# Patient Record
Sex: MALE | Race: WHITE | Employment: OTHER | URBAN - METROPOLITAN AREA
[De-identification: names, ages, dates, MRNs, and addresses within clinical notes are randomized per-mention and may not be internally consistent; named-entity substitution may affect disease eponyms.]

---

## 2017-01-01 ENCOUNTER — DOCUMENTATION ONLY (OUTPATIENT)
Dept: GERIATRIC MEDICINE | Age: 77
End: 2017-01-01

## 2017-01-01 ENCOUNTER — HOSPITAL ENCOUNTER (EMERGENCY)
Age: 77
Discharge: HOME | End: 2017-12-31
Attending: EMERGENCY MEDICINE
Payer: MEDICARE

## 2017-01-01 ENCOUNTER — APPOINTMENT (OUTPATIENT)
Dept: CT IMAGING | Age: 77
End: 2017-01-01
Attending: EMERGENCY MEDICINE
Payer: MEDICARE

## 2017-01-01 VITALS
DIASTOLIC BLOOD PRESSURE: 78 MMHG | HEIGHT: 65 IN | TEMPERATURE: 98.2 F | HEART RATE: 82 BPM | RESPIRATION RATE: 18 BRPM | OXYGEN SATURATION: 100 % | SYSTOLIC BLOOD PRESSURE: 138 MMHG | WEIGHT: 142.8 LBS | BODY MASS INDEX: 23.79 KG/M2

## 2017-01-01 DIAGNOSIS — R46.89 WANDERING: Primary | ICD-10-CM

## 2017-01-01 DIAGNOSIS — R53.81 DEBILITY: ICD-10-CM

## 2017-01-01 LAB
ALBUMIN SERPL-MCNC: 3.6 G/DL (ref 3.5–5)
ALBUMIN/GLOB SERPL: 1.2 {RATIO} (ref 1.1–2.2)
ALP SERPL-CCNC: 64 U/L (ref 45–117)
ALT SERPL-CCNC: 17 U/L (ref 12–78)
ANION GAP SERPL CALC-SCNC: 7 MMOL/L (ref 5–15)
APPEARANCE UR: CLEAR
AST SERPL-CCNC: 14 U/L (ref 15–37)
BACTERIA URNS QL MICRO: NEGATIVE /HPF
BASOPHILS # BLD: 0 K/UL (ref 0–0.1)
BASOPHILS NFR BLD: 0 % (ref 0–1)
BILIRUB SERPL-MCNC: 0.6 MG/DL (ref 0.2–1)
BILIRUB UR QL: NEGATIVE
BUN SERPL-MCNC: 24 MG/DL (ref 6–20)
BUN/CREAT SERPL: 16 (ref 12–20)
CALCIUM SERPL-MCNC: 8.5 MG/DL (ref 8.5–10.1)
CHLORIDE SERPL-SCNC: 106 MMOL/L (ref 97–108)
CO2 SERPL-SCNC: 27 MMOL/L (ref 21–32)
COLOR UR: YELLOW
CREAT SERPL-MCNC: 1.48 MG/DL (ref 0.7–1.3)
EOSINOPHIL # BLD: 0 K/UL (ref 0–0.4)
EOSINOPHIL NFR BLD: 0 % (ref 0–7)
EPITH CASTS URNS QL MICRO: NORMAL /LPF
ERYTHROCYTE [DISTWIDTH] IN BLOOD BY AUTOMATED COUNT: 13.4 % (ref 11.5–14.5)
GLOBULIN SER CALC-MCNC: 3.1 G/DL (ref 2–4)
GLUCOSE SERPL-MCNC: 91 MG/DL (ref 65–100)
GLUCOSE UR STRIP.AUTO-MCNC: NEGATIVE MG/DL
HCT VFR BLD AUTO: 38.1 % (ref 36.6–50.3)
HGB BLD-MCNC: 12.9 G/DL (ref 12.1–17)
HGB UR QL STRIP: NEGATIVE
KETONES UR QL STRIP.AUTO: NORMAL MG/DL
LEUKOCYTE ESTERASE UR QL STRIP.AUTO: NEGATIVE
LYMPHOCYTES # BLD: 1.6 K/UL (ref 0.8–3.5)
LYMPHOCYTES NFR BLD: 28 % (ref 12–49)
MCH RBC QN AUTO: 31.4 PG (ref 26–34)
MCHC RBC AUTO-ENTMCNC: 33.9 G/DL (ref 30–36.5)
MCV RBC AUTO: 92.7 FL (ref 80–99)
MONOCYTES # BLD: 0.5 K/UL (ref 0–1)
MONOCYTES NFR BLD: 10 % (ref 5–13)
NEUTS SEG # BLD: 3.4 K/UL (ref 1.8–8)
NEUTS SEG NFR BLD: 62 % (ref 32–75)
NITRITE UR QL STRIP.AUTO: NEGATIVE
PH UR STRIP: 5.5 [PH]
PLATELET # BLD AUTO: 193 K/UL (ref 150–400)
POTASSIUM SERPL-SCNC: 4.2 MMOL/L (ref 3.5–5.1)
PROT SERPL-MCNC: 6.7 G/DL (ref 6.4–8.2)
PROT UR STRIP-MCNC: NEGATIVE MG/DL
RBC # BLD AUTO: 4.11 M/UL (ref 4.1–5.7)
RBC #/AREA URNS HPF: NORMAL /HPF (ref 0–5)
SODIUM SERPL-SCNC: 140 MMOL/L (ref 136–145)
SP GR UR REFRACTOMETRY: 1.02
UROBILINOGEN UR QL STRIP.AUTO: 0.2 EU/DL
WBC # BLD AUTO: 5.6 K/UL (ref 4.1–11.1)
WBC URNS QL MICRO: NORMAL /HPF (ref 0–4)

## 2017-01-01 PROCEDURE — 80053 COMPREHEN METABOLIC PANEL: CPT | Performed by: EMERGENCY MEDICINE

## 2017-01-01 PROCEDURE — 85025 COMPLETE CBC W/AUTO DIFF WBC: CPT | Performed by: EMERGENCY MEDICINE

## 2017-01-01 PROCEDURE — 36415 COLL VENOUS BLD VENIPUNCTURE: CPT | Performed by: EMERGENCY MEDICINE

## 2017-01-01 PROCEDURE — 99284 EMERGENCY DEPT VISIT MOD MDM: CPT

## 2017-01-01 PROCEDURE — 77030011943

## 2017-01-01 PROCEDURE — 70450 CT HEAD/BRAIN W/O DYE: CPT

## 2017-01-01 PROCEDURE — 81003 URINALYSIS AUTO W/O SCOPE: CPT | Performed by: EMERGENCY MEDICINE

## 2017-04-12 RX ORDER — PRAVASTATIN SODIUM 20 MG/1
TABLET ORAL
Qty: 30 TAB | Refills: 0 | Status: SHIPPED | OUTPATIENT
Start: 2017-04-12 | End: 2017-05-10 | Stop reason: SDUPTHER

## 2017-05-10 RX ORDER — PRAVASTATIN SODIUM 20 MG/1
TABLET ORAL
Qty: 30 TAB | Refills: 0 | Status: SHIPPED | OUTPATIENT
Start: 2017-05-10 | End: 2017-06-07 | Stop reason: SDUPTHER

## 2017-06-15 ENCOUNTER — TELEPHONE (OUTPATIENT)
Dept: INTERNAL MEDICINE CLINIC | Age: 77
End: 2017-06-15

## 2017-06-15 ENCOUNTER — OFFICE VISIT (OUTPATIENT)
Dept: INTERNAL MEDICINE CLINIC | Age: 77
End: 2017-06-15

## 2017-06-15 VITALS
BODY MASS INDEX: 24.57 KG/M2 | WEIGHT: 147.5 LBS | HEIGHT: 65 IN | SYSTOLIC BLOOD PRESSURE: 100 MMHG | RESPIRATION RATE: 18 BRPM | DIASTOLIC BLOOD PRESSURE: 60 MMHG | TEMPERATURE: 97.5 F | OXYGEN SATURATION: 97 % | HEART RATE: 68 BPM

## 2017-06-15 DIAGNOSIS — R11.0 NAUSEA: ICD-10-CM

## 2017-06-15 DIAGNOSIS — I25.810 ATHEROSCLEROSIS OF CORONARY ARTERY BYPASS GRAFT OF NATIVE HEART, ANGINA PRESENCE UNSPECIFIED: ICD-10-CM

## 2017-06-15 DIAGNOSIS — R42 DIZZINESS: ICD-10-CM

## 2017-06-15 DIAGNOSIS — G30.9 ALZHEIMER'S DEMENTIA WITHOUT BEHAVIORAL DISTURBANCE, UNSPECIFIED TIMING OF DEMENTIA ONSET: Primary | Chronic | ICD-10-CM

## 2017-06-15 DIAGNOSIS — L40.9 PSORIASIS: ICD-10-CM

## 2017-06-15 DIAGNOSIS — Z13.39 SCREENING FOR ALCOHOLISM: ICD-10-CM

## 2017-06-15 DIAGNOSIS — F02.80 ALZHEIMER'S DEMENTIA WITHOUT BEHAVIORAL DISTURBANCE, UNSPECIFIED TIMING OF DEMENTIA ONSET: Primary | Chronic | ICD-10-CM

## 2017-06-15 DIAGNOSIS — Z00.00 ROUTINE GENERAL MEDICAL EXAMINATION AT A HEALTH CARE FACILITY: ICD-10-CM

## 2017-06-15 DIAGNOSIS — N40.0 BENIGN NON-NODULAR PROSTATIC HYPERPLASIA, PRESENCE OF LOWER URINARY TRACT SYMPTOMS UNSPECIFIED: ICD-10-CM

## 2017-06-15 RX ORDER — MEMANTINE HYDROCHLORIDE 10 MG/1
10 TABLET ORAL DAILY
Qty: 30 TAB | Refills: 5
Start: 2017-06-15

## 2017-06-15 RX ORDER — FLUOCINONIDE 1 MG/G
CREAM TOPICAL DAILY
COMMUNITY
End: 2018-01-01

## 2017-06-15 NOTE — MR AVS SNAPSHOT
Visit Information Date & Time Provider Department Dept. Phone Encounter #  
 6/15/2017  3:00 PM Norm Hatchet, MD [place] [id number] [facility] [phone] [id number] Follow-up Instructions Return in about 6 months (around 12/15/2017) for F/U SDAT. Upcoming Health Maintenance Date Due  
 MEDICARE YEARLY EXAM 8/10/2003 DTaP/Tdap/Td series (1 - Tdap) 1/2/2014 INFLUENZA AGE 9 TO ADULT 8/1/2017 GLAUCOMA SCREENING Q2Y 2/10/2018 Allergies as of 6/15/2017  Review Complete On: 6/15/2017 By: Jose Juan Paul LPN Severity Noted Reaction Type Reactions Peanut  06/23/2011   Systemic Anaphylaxis Sulfa (Sulfonamide Antibiotics)  11/27/2010    Unknown (comments) Current Immunizations  Reviewed on 6/24/2011 Name Date Influenza Vaccine 10/6/2015 Pneumococcal Polysaccharide (PPSV-23) 10/1/2014 Pneumococcal Vaccine (Unspecified Type) 1/1/2009 TB Skin Test (PPD) 10/23/2014 Td 1/1/2014 Zoster Vaccine, Live 2/2/2014 Not reviewed this visit You Were Diagnosed With   
  
 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, angina presence unspecified     ICD-10-CM: I25.810 ICD-9-CM: 414.04 Benign non-nodular prostatic hyperplasia, presence of lower urinary tract symptoms unspecified     ICD-10-CM: N40.0 ICD-9-CM: 600.90 Vitals BP Pulse Temp Resp Height(growth percentile) Weight(growth percentile)  100/60 (BP 1 Location: Right arm, BP Patient Position: Sitting) 68 97.5 °F (36.4 °C) (Oral) 18 5' 4.96\" (1.65 m) 147 lb 8 oz (66.9 kg) SpO2 BMI Smoking Status 97% 24.58 kg/m2 Never Smoker Vitals History BMI and BSA Data Body Mass Index Body Surface Area 24.58 kg/m 2 1.75 m 2 Preferred Pharmacy Pharmacy Name Phone [facility] [address] [place] [address], [facility] [phone] Your Updated Medication List  
  
   
This list is accurate as of: 6/15/17  3:21 PM.  Always use your most recent med list.  
  
  
  
  
 aspirin delayed-release 81 mg tablet Take 81 mg by mouth daily. betamethasone dipropionate 0.05 % topical cream  
Commonly known as:  Nathalie Main Apply  to affected area two (2) times a day. docusate sodium 50 mg capsule Commonly known as:  Arabella Scarce Take 50 mg by mouth two (2) times a day. donepezil 5 mg tablet Commonly known as:  ARICEPT Take 10 mg by mouth nightly. fexofenadine 180 mg tablet Commonly known as:  Alejandro Leash Take  by mouth. * fluocinonide 0.1 % topical cream  
Commonly known as:  VANOS Apply  to affected area daily. * fluocinonide 0.1 % topical cream  
Commonly known as:  VANOS Apply  to affected area daily. memantine 10 mg tablet Commonly known as:  Gisela Orts Take 1 Tab by mouth daily. multivitamin,tx-iron-ca-min 27-0.4 mg Tab Commonly known as:  THERA-M w/ IRON Take 1 Tab by mouth daily. pravastatin 20 mg tablet Commonly known as:  PRAVACHOL  
TAKE 1 TABLET BY MOUTH EVERY DAY  
  
 prednisoLONE sodium phosphate 1 % ophthalmic solution Commonly known as:  INFLAMASE FORTE * Notice: This list has 2 medication(s) that are the same as other medications prescribed for you. Read the directions carefully, and ask your doctor or other care provider to review them with you. Follow-up Instructions Return in about 6 months (around 12/15/2017) for F/U SDAT. To-Do List   
 06/16/2017 Lab:  CBC WITH AUTOMATED DIFF   
  
 06/16/2017 Lab:  LIPID PANEL   
  
 06/16/2017 Lab:  METABOLIC PANEL, COMPREHENSIVE   
  
 06/16/2017 Lab:  PSA W/ REFLX FREE PSA   
  
 06/16/2017 Lab:  TSH REFLEX TO T4   
  
 06/16/2017 Lab:  URINALYSIS W/ RFLX MICROSCOPIC   
  
 06/16/2017 Lab:  VITAMIN B12 & FOLATE Patient Instructions Eat a balanced diet with plenty of fruits and vegetables. Stay physically active. Continue your current medications. Have fasting blood work analysis. Follow up with specialists. Introducing [facility]! Mehnaz Flynn introduces [employer] patient portal. Now you can access parts of your medical record, email your doctor's office, and request medication refills online. 1. In your internet browser, go to [email or web address] 2. Click on the First Time User? Click Here link in the Sign In box. You will see the New Member Sign Up page. 3. Enter your [employer] Access Code exactly as it appears below. You will not need to use this code after youve completed the sign-up process. If you do not sign up before the expiration date, you must request a new code. · [employer] Access Code: [email or web address] Expires: 9/13/2017  3:21 PM 
 
4. Enter the last four digits of your Social Security Number (xxxx) and Date of Birth (mm/dd/yyyy) as indicated and click Submit. You will be taken to the next sign-up page. 5. Create a [employer] ID. This will be your [employer] login ID and cannot be changed, so think of one that is secure and easy to remember. 6. Create a [employer] password. You can change your password at any time. 7. Enter your Password Reset Question and Answer. This can be used at a later time if you forget your password. 8. Enter your e-mail address. You will receive e-mail notification when new information is available in [address] 9. Click Sign Up.  You can now view and download portions of your medical record. 10. Click the Download Summary menu link to download a portable copy of your medical information. If you have questions, please visit the Frequently Asked Questions section of the [employer] website. Remember, [employer] is NOT to be used for urgent needs. For medical emergencies, dial 911. Now available from your iPhone and Android! Please provide this summary of care documentation to your next provider. Your primary care clinician is listed as Yodit Rendon. If you have any questions after today's visit, please call [phone].

## 2017-06-15 NOTE — PROGRESS NOTES
Subjective:     Chief Complaint   Patient presents with    Cholesterol Problem    Back Pain     He  is a 64y.o. year old male who presents for evaluation. Concerns:  1. Psoriasis  2. Can't stand long. 3. Pain in left flank area (x one month)  4. Dizziness  5. Nausea and wooziness      Historical Data    Past Medical History:   Diagnosis Date    CAD (coronary artery disease)     MI    Corneal dystrophy     H/O poliomyelitis     Hypercholesterolemia     Hypertension     Psoriasis     Right inguinal hernia         Past Surgical History:   Procedure Laterality Date    CABG, ARTERY-VEIN, FOUR  8/9/2010    HX APPENDECTOMY      HX CATARACT REMOVAL Bilateral 1990's    HX CORNEAL TRANSPLANT      HX CORONARY STENT PLACEMENT  6/1/2011    HX HEENT      deviated septum     HX OTHER SURGICAL      eye surgery        Outpatient Encounter Prescriptions as of 6/15/2017   Medication Sig Dispense Refill    pravastatin (PRAVACHOL) 20 mg tablet TAKE 1 TABLET BY MOUTH EVERY DAY 30 Tab 5    fexofenadine (ALLEGRA) 180 mg tablet Take  by mouth.  docusate sodium (COLACE) 50 mg capsule Take 50 mg by mouth two (2) times a day.  fluocinonide (VANOS) 0.1 % topical cream Apply  to affected area daily.  betamethasone dipropionate (DIPROSONE) 0.05 % topical cream Apply  to affected area two (2) times a day.  donepezil (ARICEPT) 5 mg tablet Take 5 mg by mouth nightly. 12    prednisoLONE sodium phosphate (INFLAMASE FORTE) 1 % ophthalmic solution   6    multivitamin,tx-iron-ca-min (THERA-M) 27-0.4 mg Tab Take 1 Tab by mouth daily.  aspirin delayed-release 81 mg tablet Take 81 mg by mouth daily. No facility-administered encounter medications on file as of 6/15/2017.          Allergies   Allergen Reactions    Peanut Anaphylaxis    Sulfa (Sulfonamide Antibiotics) Unknown (comments)        Social History     Social History    Marital status:      Spouse name: N/A    Number of children: N/A  Years of education: N/A     Occupational History    Not on file. Social History Main Topics    Smoking status: Never Smoker    Smokeless tobacco: Never Used    Alcohol use No    Drug use: No    Sexual activity: Not on file     Other Topics Concern    Not on file     Social History Narrative        Review of Systems  A comprehensive review of systems was negative except for that written in the HPI. Objective:     Vitals:    06/15/17 1459   Pulse: 68   Resp: 18   Temp: 97.5 °F (36.4 °C)   TempSrc: Oral   SpO2: 97%   Weight: 147 lb 8 oz (66.9 kg)   Height: 5' 4.96\" (1.65 m)     Pleasant WM with SDAT  Skin:  Psoriatic dermatitis on LE's  Head:  NC/NT  Ears:  TM's without bulging or blood. Eyes:  Left cornea is opacified. Throat: Clear  Neck: Supple  Cardiac: RRR without murmurs, gallops or rubs. Lungs: Clear to auscultation. Abdomen:  Soft and non-tender  Neuro:  CN II-XII grossly normal.               Motor strength is symmetric and full. Reflexes are symmetric. Rectal: Normal sphincter tone. Prostate is smooth and moderately enlarged. ASSESSMENT / PLAN:   1. Routine general medical examination at a health care facility  · Well balanced diet. · Stay active. · Continue current medications  · Work up of dizziness.  - CBC WITH AUTOMATED DIFF; Future  - TSH REFLEX TO T4; Future  - URINALYSIS W/ RFLX MICROSCOPIC; Future  - METABOLIC PANEL, COMPREHENSIVE; Future  - PSA W/ REFLX FREE PSA; Future    2. Screening for alcoholism      3. Alzheimer's dementia without behavioral disturbance, unspecified timing of dementia onset  · Followed by Neurology. - memantine (NAMENDA) 10 mg tablet; Take 1 Tab by mouth daily. Dispense: 30 Tab; Refill: 5  - VITAMIN B12 & FOLATE; Future    4. Psoriasis      5. 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 - LIPID PANEL; Future    6.  Benign non-nodular prostatic hyperplasia, presence of lower urinary tract symptoms unspecified      7. Dizziness  · Maybe due to cerebrovascular factors vs medications    8. Nausea  · Medications? Patient Instructions   Eat a balanced diet with plenty of fruits and vegetables. Stay physically active. Continue your current medications. Have fasting blood work analysis. Follow up with specialists. Follow-up Disposition:  Return in about 6 months (around 12/15/2017) for F/U SDAT. Advised him to call back or return to office if symptoms worsen/change/persist.  Discussed expected course/resolution/complications of diagnosis in detail with patient. Medication risks/benefits/costs/interactions/alternatives discussed with patient. He was given an after visit summary which includes diagnoses, current medications, & vitals. He expressed understanding with the diagnosis and plan. This is a Subsequent Medicare Annual Wellness Visit providing Personalized Prevention Plan Services (PPPS) (Performed 12 months after initial AWV and PPPS )    I have reviewed the patient's medical history in detail and updated the computerized patient record. History     Past Medical History:   Diagnosis Date    CAD (coronary artery disease)     MI    Corneal dystrophy     H/O poliomyelitis     Hypercholesterolemia     Hypertension     Psoriasis     Right inguinal hernia       Past Surgical History:   Procedure Laterality Date    CABG, ARTERY-VEIN, FOUR  8/9/2010    HX APPENDECTOMY      HX CATARACT REMOVAL Bilateral 1990's    HX CORNEAL TRANSPLANT      HX CORONARY STENT PLACEMENT  6/1/2011    HX HEENT      deviated septum     HX OTHER SURGICAL      eye surgery     Current Outpatient Prescriptions   Medication Sig Dispense Refill    fluocinonide (VANOS) 0.1 % topical cream Apply  to affected area daily.       pravastatin (PRAVACHOL) 20 mg tablet TAKE 1 TABLET BY MOUTH EVERY DAY 30 Tab 5    docusate sodium (COLACE) 50 mg capsule Take 50 mg by mouth two (2) times a day.  fluocinonide (VANOS) 0.1 % topical cream Apply  to affected area daily.  betamethasone dipropionate (DIPROSONE) 0.05 % topical cream Apply  to affected area two (2) times a day.  donepezil (ARICEPT) 5 mg tablet Take 10 mg by mouth nightly. 12    prednisoLONE sodium phosphate (INFLAMASE FORTE) 1 % ophthalmic solution   6    multivitamin,tx-iron-ca-min (THERA-M) 27-0.4 mg Tab Take 1 Tab by mouth daily.  aspirin delayed-release 81 mg tablet Take 81 mg by mouth daily.  fexofenadine (ALLEGRA) 180 mg tablet Take  by mouth. 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 No    Do you average more than 14 drinks per week? No      Functional Ability and Level of Safety:     Hearing Loss   mild    Activities of Daily Living   Self-care.    Requires assistance with: bathing and hygiene and no ADLs    Fall Risk     Fall Risk Assessment, last 12 mths 6/15/2017   Able to walk? Yes   Fall in past 12 months? Yes   Fall with injury? Yes   Number of falls in past 12 months 1   Fall Risk Score 2     Abuse Screen   None  Patient is not abused    Review of Systems   A comprehensive review of systems was negative except for that written in the HPI. Physical Examination     Evaluation of Cognitive Function:  Mood/affect:  neutral  Appearance: age appropriate and casually dressed  Family member/caregiver input: son    Visit Vitals    /60 (BP 1 Location: Right arm, BP Patient Position: Sitting)    Pulse 68    Temp 97.5 °F (36.4 °C) (Oral)    Resp 18    Ht 5' 4.96\" (1.65 m)    Wt 147 lb 8 oz (66.9 kg)    SpO2 97%    BMI 24.58 kg/m2     General appearance: alert, cooperative, no distress, appears stated age  Head: Normocephalic, without obvious abnormality, atraumatic  Eyes: positive findings: Opacified O.S  Ears: normal TM's and external ear canals AU  Throat: Lips, mucosa, and tongue normal. Teeth and gums normal  Lungs: clear to auscultation bilaterally  Heart: regular rate and rhythm, S1, S2 normal, no murmur, click, rub or gallop  Rectal: Modest prostatic hypertrophy  Extremities: extremities normal, atraumatic, no cyanosis or edema  Skin: Skin color, texture, turgor normal. No rashes or lesions  Neurologic: Grossly normal    Patient Care Team:  Enriqueta Lentz MD as PCP - General (Internal Medicine)  Dulce Singleton MD (Inactive) (Cardiology)  Ziggy Laws MD (Neurology)    Advice/Referrals/Counseling   Education and counseling provided:  Are appropriate based on today's review and evaluation  End-of-Life planning (with patient's consent)      Assessment/Plan       ICD-10-CM ICD-9-CM    1. Alzheimer's dementia without behavioral disturbance, unspecified timing of dementia onset G30.9 331.0 memantine (NAMENDA) 10 mg tablet    F02.80 294.10 VITAMIN B12 & FOLATE   2. Routine general medical examination at a health care facility Z00.00 V70.0 CBC WITH AUTOMATED DIFF      TSH REFLEX TO T4      URINALYSIS W/ RFLX MICROSCOPIC      METABOLIC PANEL, COMPREHENSIVE      PSA W/ REFLX FREE PSA   3. Screening for alcoholism Z13.89 V79.1    4. Psoriasis L40.9 696.1    5. 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 Benign non-nodular prostatic hyperplasia, presence of lower urinary tract symptoms unspecified N40.0 600.90      Encounter Diagnoses   Name Primary? 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

## 2017-06-15 NOTE — PATIENT INSTRUCTIONS
Eat a balanced diet with plenty of fruits and vegetables. Stay physically active. Continue your current medications. Have fasting blood work analysis. Follow up with specialists.

## 2017-06-15 NOTE — PROGRESS NOTES
Chief Complaint   Patient presents with    Cholesterol Problem    Back Pain     Dizziness  Reviewed record in preparation for visit and have obtained necessary documentation. Identified pt with two pt identifiers(name and ). Health Maintenance Due   Topic    MEDICARE YEARLY EXAM     DTaP/Tdap/Td series (1 - Tdap)         Chief Complaint   Patient presents with    Cholesterol Problem    Back Pain        Wt Readings from Last 3 Encounters:   06/15/17 147 lb 8 oz (66.9 kg)   16 142 lb 12.8 oz (64.8 kg)   16 137 lb 12.6 oz (62.5 kg)     Temp Readings from Last 3 Encounters:   06/15/17 97.5 °F (36.4 °C) (Oral)   16 98.6 °F (37 °C) (Oral)   16 97.8 °F (36.6 °C)     BP Readings from Last 3 Encounters:   06/15/17 100/60   16 110/60   16 126/63     Pulse Readings from Last 3 Encounters:   06/15/17 68   16 63   16 70           Learning Assessment:  :     Learning Assessment 2016   PRIMARY LEARNER Patient   HIGHEST LEVEL OF EDUCATION - PRIMARY LEARNER  > 4 YEARS OF COLLEGE   BARRIERS PRIMARY LEARNER NONE   CO-LEARNER CAREGIVER No   PRIMARY LANGUAGE ENGLISH    NEED No   LEARNER PREFERENCE PRIMARY OTHER (COMMENT)   LEARNING SPECIAL TOPICS no   ANSWERED BY self   RELATIONSHIP SELF   ASSESSMENT COMMENT no       Depression Screening:  :     PHQ over the last two weeks 2016   Little interest or pleasure in doing things Not at all   Feeling down, depressed or hopeless Not at all   Total Score PHQ 2 0       Fall Risk Assessment:  :     Fall Risk Assessment, last 12 mths 6/15/2017   Able to walk? Yes   Fall in past 12 months? Yes   Fall with injury? Yes   Number of falls in past 12 months 1   Fall Risk Score 2       Abuse Screening:  :     No flowsheet data found.     Coordination of Care Questionnaire:  :     1) Have you been to an emergency room, urgent care clinic since your last visit? no   Hospitalized since your last visit? no             2) Have you seen or consulted any other health care providers outside of [facility] since your last visit? yes  (Include any pap smears or colon screenings in this section.)    3) Do you have an Advance Directive on file? no    4) Are you interested in receiving information on Advance Directives? NO      Patient is accompanied by son I have received verbal consent from Kerry Luo to discuss any/all medical information while they are present in the room. Reviewed record  In preparation for visit and have obtained necessary documentation.

## 2017-06-15 NOTE — TELEPHONE ENCOUNTER
Received incoming call from pt's daughter, Pasquale Goldberg, who voiced concerns that \"alcholism\" was listed on her fathers chart from his most recent office visit & he has never consumed alcohol a day in his life. She questioned if \"alcohol\" was listed on his chart d/t his behaviors caused by his diagnosis of Alzheimer's and states \"this is a slap in the face to him. \"  Educated pt that the associated diagnose listed on her father's chart is Screening for Alcoholism not \"Alcoholic\" and is listed to fulfill Medicare's requirements for the Medicare Annual Wellness visit. Kathy was given an opportunity to ask questions, repeated information, and verbalized understanding. All questions were answered to her satisfaction. No further questions or concerns at this time.

## 2017-06-16 ENCOUNTER — HOSPITAL ENCOUNTER (OUTPATIENT)
Dept: LAB | Age: 77
Discharge: HOME | End: 2017-06-16
Payer: MEDICARE

## 2017-06-16 PROCEDURE — 80061 LIPID PANEL: CPT

## 2017-06-16 PROCEDURE — 84443 ASSAY THYROID STIM HORMONE: CPT

## 2017-06-16 PROCEDURE — 80053 COMPREHEN METABOLIC PANEL: CPT

## 2017-06-16 PROCEDURE — 82607 VITAMIN B-12: CPT

## 2017-06-16 PROCEDURE — 85025 COMPLETE CBC W/AUTO DIFF WBC: CPT

## 2017-06-16 PROCEDURE — 81003 URINALYSIS AUTO W/O SCOPE: CPT

## 2017-06-16 PROCEDURE — 84153 ASSAY OF PSA TOTAL: CPT

## 2017-06-19 DIAGNOSIS — R94.4 ABNORMAL RESULTS OF KIDNEY FUNCTION STUDIES: ICD-10-CM

## 2017-06-19 DIAGNOSIS — N28.9 RENAL INSUFFICIENCY: Primary | ICD-10-CM

## 2017-06-20 ENCOUNTER — HOSPITAL ENCOUNTER (OUTPATIENT)
Dept: LAB | Age: 77
Discharge: HOME | End: 2017-06-20
Payer: MEDICARE

## 2017-06-20 DIAGNOSIS — Z00.00 ROUTINE GENERAL MEDICAL EXAMINATION AT A HEALTH CARE FACILITY: Primary | ICD-10-CM

## 2017-06-20 PROCEDURE — 81003 URINALYSIS AUTO W/O SCOPE: CPT

## 2017-06-20 PROCEDURE — 81015 MICROSCOPIC EXAM OF URINE: CPT

## 2017-06-21 ENCOUNTER — HOSPITAL ENCOUNTER (OUTPATIENT)
Dept: ULTRASOUND IMAGING | Age: 77
Discharge: HOME | End: 2017-06-21

## 2017-06-21 DIAGNOSIS — N28.9 RENAL INSUFFICIENCY: ICD-10-CM

## 2017-06-21 DIAGNOSIS — R94.4 ABNORMAL RESULTS OF KIDNEY FUNCTION STUDIES: ICD-10-CM

## 2017-06-21 LAB
APPEARANCE UR: ABNORMAL
BACTERIA #/AREA URNS HPF: NORMAL /[HPF]
BILIRUB UR QL STRIP: NEGATIVE
CASTS URNS QL MICRO: NORMAL /LPF
COLOR UR: YELLOW
EPI CELLS #/AREA URNS HPF: NORMAL /HPF
GLUCOSE UR QL: NEGATIVE
HGB UR QL STRIP: NEGATIVE
KETONES UR QL STRIP: NEGATIVE
LEUKOCYTE ESTERASE UR QL STRIP: NEGATIVE
MICRO URNS: ABNORMAL
MUCOUS THREADS URNS QL MICRO: PRESENT
NITRITE UR QL STRIP: POSITIVE
PH UR STRIP: 7 [PH] (ref 5–7.5)
PROT UR QL STRIP: NEGATIVE
RBC #/AREA URNS HPF: NORMAL /HPF
SP GR UR: 1.02 (ref 1–1.03)
UROBILINOGEN UR STRIP-MCNC: 0.2 MG/DL (ref 0.2–1)
WBC #/AREA URNS HPF: NORMAL /HPF

## 2017-06-21 PROCEDURE — 76770 US EXAM ABDO BACK WALL COMP: CPT

## 2017-06-22 DIAGNOSIS — N28.1 RENAL CYST: Primary | ICD-10-CM

## 2017-12-31 NOTE — ED PROVIDER NOTES
HPI Comments: 76 y.o. male with past medical history significant for HTN, CAD, hypercholesterolemia, corneal dystrophy, dementia, poliomyelitis, appendectomy, CABG, cataract removal, corneal transplant, coronary stent placement who presents from home with chief complaint of confusion. Per pt's son, pt is living independently at [facility] with his wife but has been in a mental decline for several weeks. Pt reportedly used the wall as a urinal 10 days ago, has had multiple falls over the past month, and has been reportedly wandering outside with increased confusion. Pt's son wants the pt moved to assisted living at [facility], but states the facility doesn't have the required documentation from Dr. Mustapha Gonzalez. Pt's urine has reportedly been orange/red, as he's not been eating or drinking well. Pt hasn't been febrile, cough, or chills. Pt also doesn't see well due to hx of corneal transplants. There are no other acute medical concerns at this time. PCP: Lizzie Rose MD    Full history, physical exam, and ROS unable to be obtained due to:  dementia. The history is provided by a caregiver.         Past Medical History:   Diagnosis Date    CAD (coronary artery disease)     MI    Corneal dystrophy     Dementia     H/O poliomyelitis     Hypercholesterolemia     Hypertension     Psoriasis     Right inguinal hernia        Past Surgical History:   Procedure Laterality Date    CABG, ARTERY-VEIN, FOUR  8/9/2010    HX APPENDECTOMY      HX CATARACT REMOVAL Bilateral 1's    HX CORNEAL TRANSPLANT      HX CORONARY STENT PLACEMENT  6/1/2011    HX HEENT      deviated septum     HX OTHER SURGICAL      eye surgery         Family History:   Problem Relation Age of Onset    Cancer Mother 45     pancreatic    Heart Disease Father 50       Social History     Social History    Marital status:      Spouse name: N/A    Number of children: N/A    Years of education: N/A     Occupational History    Not on file. Social History Main Topics    Smoking status: Never Smoker    Smokeless tobacco: Never Used    Alcohol use No    Drug use: No    Sexual activity: Not on file     Other Topics Concern    Not on file     Social History Narrative         ALLERGIES: Peanut and Sulfa (sulfonamide antibiotics)    Review of Systems   Constitutional: Positive for appetite change. Negative for chills and fever. HENT: Negative for rhinorrhea, sore throat and trouble swallowing. Eyes: Negative for photophobia. Respiratory: Negative for cough and shortness of breath. Cardiovascular: Negative for chest pain and palpitations. Gastrointestinal: Negative for abdominal pain, nausea and vomiting. Genitourinary: Negative for dysuria, frequency and hematuria. Urine orange/red in color   Musculoskeletal: Negative for arthralgias. Neurological: Negative for dizziness, syncope and weakness. Psychiatric/Behavioral: Positive for confusion. Negative for behavioral problems. The patient is not nervous/anxious. All other systems reviewed and are negative. Vitals:    12/31/17 1251   BP: 98/65   Pulse: 82   Resp: 18   Temp: 97.6 °F (36.4 °C)   SpO2: 98%   Weight: 64.8 kg (142 lb 12.8 oz)   Height: 5' 5\" (1.651 m)            Physical Exam   Constitutional: He appears well-developed and well-nourished. HENT:   Head: Normocephalic and atraumatic. Mouth/Throat: Oropharynx is clear and moist.   Eyes:   atrophic L eye with corneal opacity   Neck: Normal range of motion. Neck supple. Cardiovascular: Normal rate, regular rhythm, normal heart sounds and intact distal pulses. Exam reveals no gallop and no friction rub. No murmur heard. Pulmonary/Chest: Effort normal. No respiratory distress. He has no wheezes. He has no rales. Abdominal: Soft. There is no tenderness. There is no rebound. Musculoskeletal: Normal range of motion. He exhibits no tenderness. Neurological: He is alert.  No cranial nerve deficit. Motor; symmetric. Oriented to self only   Skin: No erythema. Psychiatric: He has a normal mood and affect. His behavior is normal.   Nursing note and vitals reviewed.    Note written by Kia Taylor, as dictated by Dani Vergara MD 3:26 PM      [facility]  ED Course       Procedures

## 2017-12-31 NOTE — PROGRESS NOTES
I am the provider tan for [facility] and my attending is listed as the resident's PCP on file with [facility]. The staff are concered as the patient's daughter telephoned them demanding that the patient be acutly moved to [facility] unit for care as he was eloping from his [place] apartment with his wife. [facility] wanted to know if our practice was his pcp and could we write orders for him to come to Healthcare until further arrangement could be made. I have reviewed the patient's record and it is noted the Dr. Demetrius Partida is Mr. Mayur Clement PCP and he has not seen him since June 2017. Unfortunately I can not write from him to go to Healthcare as he has not had a documented visit with a provider that can attest to his health and abilities at this time. I have recommended that the family take him to the ER for assessment for any underlying concerns or health conditions. If he checks out there then I can write for him to go to healthcare if [facility] requests since he is not capable for providing his care safely anymore due to his advancing dementia and elopement risk. As well as the wife is not able to safely care for him either. Negar notes they discussed this with the wife and his POA son , Rufino Nguyen whom states they will take Mr. Ita Oliveira in the am to [facility] [place] ER for evaluation due to increasing agitation and irritability. The family will let us know what they need moving forward. I have placed a referral to the ER for the circumstances above.

## 2017-12-31 NOTE — ED TRIAGE NOTES
Pt. complains of feeling weak. Pt. Is confused and is not clear as to why he is here in the ER. Per son pt presents for hallucinations and dehydration, pt lives at [facility]. Per son pt is in need of evaluation for dementia/alzheimer's to moved to memory unit. Per son pt has been seen and diagnosed for this and is on Namenda but records have not been sent to facility to assist in this matter.

## 2017-12-31 NOTE — PROGRESS NOTES
Care Management ED Assessment    **CM Consult:  Needs to move from independent living to assisted living, resides at [facility]**    ACO/Core Measure  RRAT 22/0/1  PCP:  Dr. Velma Isidro is a 76 y.o. male who presents with complaint of confusion. Per pt's son, pt is living independently at [facility] with his wife but has been in a mental decline for several weeks. Per daughter and son provider asked that patient be medically cleared prior to transfer to [facility]. PCP: Janie Kumar MD    Care Management Interventions  PCP Verified by CM: Yes (Dr. Karina Newell)  Transition of Care Consult (CM Consult): Discharge Planning (Transferring from [place] [id number] to [facility] at [facility])  [address] Brennon Zepeda: No  Discharge Durable Medical Equipment: No  Health Maintenance Reviewed: Yes  Physical Therapy Consult: No  Occupational Therapy Consult: No  Speech Therapy Consult: No  Current Support Network: Other ([facility]/Spoke with [address] Supervisor)  Plan discussed with Pt/Family/Caregiver: Yes (Met with family, Mr. Mikaela Raymond has been accepted into Health Care Unit, Margi Hogan to take report [phone].  )  Discharge Location  Discharge Placement: Other:    Spoke with ED RN, report to be called to [address] at [facility]. Family in agreement with plan.     Evelina Cristina,CRM

## 2017-12-31 NOTE — DISCHARGE INSTRUCTIONS
Weakness: Care Instructions  Your Care Instructions    Weakness is a lack of physical or muscle strength. You may feel that you need to make extra effort to move your arms, legs, or other muscles. Generalized weakness means that you feel weak in most areas of your body. Another type of weakness may affect just one muscle or group of muscles. You may feel weak and tired after you have done too much activity, such as taking an extra-long hike. This is not a serious problem. It often goes away on its own. Feeling weak can also be caused by medical conditions like thyroid problems, depression, or a virus. Sometimes the cause can be serious. Your doctor may want to do more tests to try to find the cause of the weakness. The doctor has checked you carefully, but problems can develop later. If you notice any problems or new symptoms, get medical treatment right away. Follow-up care is a key part of your treatment and safety. Be sure to make and go to all appointments, and call your doctor if you are having problems. It's also a good idea to know your test results and keep a list of the medicines you take. How can you care for yourself at home? · Rest when you feel tired. · Be safe with medicines. If your doctor prescribed medicine, take it exactly as prescribed. Call your doctor if you think you are having a problem with your medicine. You will get more details on the specific medicines your doctor prescribes. · Do not skip meals. Eating a balanced diet may increase your energy level. · Get some physical activity every day, but do not get too tired. When should you call for help? Call your doctor now or seek immediate medical care if:  ? · You have new or worse weakness. ? · You are dizzy or lightheaded, or you feel like you may faint. ? Watch closely for changes in your health, and be sure to contact your doctor if:  ? · You do not get better as expected. Where can you learn more?   Go to http://tanya.info/. Enter [id number] in the search box to learn more about \"Weakness: Care Instructions. \"  Current as of: March 20, 2017  Content Version: 11.4  © 1584-8229 [employer], [employer]. Care instructions adapted under license by [employer] (which disclaims liability or warranty for this information). If you have questions about a medical condition or this instruction, always ask your healthcare professional. [address] any warranty or liability for your use of this information.

## 2018-01-01 ENCOUNTER — PATIENT OUTREACH (OUTPATIENT)
Dept: INTERNAL MEDICINE CLINIC | Age: 78
End: 2018-01-01

## 2018-01-01 ENCOUNTER — HOSPITAL ENCOUNTER (OUTPATIENT)
Dept: LAB | Age: 78
Discharge: HOME | End: 2018-05-04

## 2018-01-01 ENCOUNTER — HOSPITAL ENCOUNTER (OUTPATIENT)
Dept: LAB | Age: 78
Discharge: HOME | End: 2018-05-04
Payer: MEDICARE

## 2018-01-01 ENCOUNTER — TELEPHONE (OUTPATIENT)
Dept: INTERNAL MEDICINE CLINIC | Age: 78
End: 2018-01-01

## 2018-01-01 ENCOUNTER — OFFICE VISIT (OUTPATIENT)
Dept: INTERNAL MEDICINE CLINIC | Age: 78
End: 2018-01-01

## 2018-01-01 VITALS
OXYGEN SATURATION: 98 % | HEART RATE: 74 BPM | DIASTOLIC BLOOD PRESSURE: 60 MMHG | WEIGHT: 140.8 LBS | HEIGHT: 65 IN | RESPIRATION RATE: 18 BRPM | TEMPERATURE: 97.9 F | SYSTOLIC BLOOD PRESSURE: 110 MMHG | BODY MASS INDEX: 23.46 KG/M2

## 2018-01-01 DIAGNOSIS — G30.9 ALZHEIMER'S DEMENTIA WITHOUT BEHAVIORAL DISTURBANCE, UNSPECIFIED TIMING OF DEMENTIA ONSET: Primary | Chronic | ICD-10-CM

## 2018-01-01 DIAGNOSIS — I25.10 ATHEROSCLEROSIS OF NATIVE CORONARY ARTERY OF NATIVE HEART WITHOUT ANGINA PECTORIS: ICD-10-CM

## 2018-01-01 DIAGNOSIS — F02.80 ALZHEIMER'S DEMENTIA WITHOUT BEHAVIORAL DISTURBANCE, UNSPECIFIED TIMING OF DEMENTIA ONSET: Primary | Chronic | ICD-10-CM

## 2018-01-01 LAB
BACTERIA SPEC CULT: ABNORMAL
BACTERIA SPEC CULT: ABNORMAL
BACTERIA SPEC CULT: NORMAL
SERVICE CMNT-IMP: ABNORMAL
SERVICE CMNT-IMP: NORMAL

## 2018-01-01 PROCEDURE — 87186 SC STD MICRODIL/AGAR DIL: CPT | Performed by: OPHTHALMOLOGY

## 2018-01-01 PROCEDURE — 87070 CULTURE OTHR SPECIMN AEROBIC: CPT | Performed by: OPHTHALMOLOGY

## 2018-01-01 PROCEDURE — 87077 CULTURE AEROBIC IDENTIFY: CPT | Performed by: OPHTHALMOLOGY

## 2018-01-01 PROCEDURE — 87102 FUNGUS ISOLATION CULTURE: CPT | Performed by: OPHTHALMOLOGY

## 2018-01-03 NOTE — PROGRESS NOTES
Patient seen at [facility] [place] ED with c/o confusion, dark urine and several falls. Mr. Andrei Singh lives at [facility] with his wife. Called and spoke with Hailey Davila., son of patient (on HIPPA); confirmed identity with two identifiers. Has been experiencing worsening wandering behavior. Staff placed an alarm on door but patient has been able to get out 5x per son. Patient's wife is exhausted by the constant vigilance that is needed for patient. Family are looking into the memory care unit at [facility]. Confirmed son will be contacted neurologist for OV notes to confirm dx for facility. Presently, patient is in the Health Care Unit. Discussed using in-house medical team for future care due to the increasing difficulty they will experience trying to get patient to PCP. Son said they are aware of medical personnel at facility but do not know if they want to transfer all his care at this time. Believes patient's wife would like to keep Dr. Marquise Eduardo at this time. With time this will become more difficult to maintain with his progressing alzheimer's. Family acknowledges this. Follow up appt rescheduled to 1/12/17 @2:30 p.m. Labs:  BUN  24  Cr      1.48  Head CT: IMPRESSION: Atrophy, especially in the temporal lobes. No acute intracranial  process identified. Goals      Move to safer environment (pt-stated)            1/3/18 - family working on transitioning patient from health care unit to memory care unit at [facility].

## 2018-01-04 NOTE — PROGRESS NOTES
Called and left confidential message on son, Don CortesJr lida's personal voice mail. Identified self and provided date and time of appt for patient (1/12/18 @2:30) as well as office number if he had any questions.

## 2018-01-12 NOTE — PROGRESS NOTES
Subjective:     Chief Complaint   Patient presents with   Umm Mobley Fall     He  is a 76y.o. year old male who presents for evaluation. Is in [facility] care level. Needed to go to ED to get paper work initiated. States appetite is good. Sleeping OK. Getting PT there. Walks with the aid of a walker. Will likely need to transition to physician there. Historical Data    Past Medical History:   Diagnosis Date    CAD (coronary artery disease)     MI    Corneal dystrophy     Dementia     H/O poliomyelitis     Hypercholesterolemia     Hypertension     Psoriasis     Right inguinal hernia         Past Surgical History:   Procedure Laterality Date    CABG, ARTERY-VEIN, FOUR  8/9/2010    HX APPENDECTOMY      HX CATARACT REMOVAL Bilateral 1990's    HX CORNEAL TRANSPLANT      HX CORONARY STENT PLACEMENT  6/1/2011    HX HEENT      deviated septum     HX OTHER SURGICAL      eye surgery        Outpatient Encounter Prescriptions as of 1/12/2018   Medication Sig Dispense Refill    pravastatin (PRAVACHOL) 20 mg tablet TAKE 1 TABLET BY MOUTH EVERY DAY 30 Tab 5    memantine (NAMENDA) 10 mg tablet Take 1 Tab by mouth daily. 30 Tab 5    fluocinonide (VANOS) 0.1 % topical cream Apply  to affected area daily.  betamethasone dipropionate (DIPROSONE) 0.05 % topical cream Apply  to affected area two (2) times a day.  prednisoLONE sodium phosphate (INFLAMASE FORTE) 1 % ophthalmic solution   6    aspirin delayed-release 81 mg tablet Take 81 mg by mouth daily.  fluocinonide (VANOS) 0.1 % topical cream Apply  to affected area daily.  fexofenadine (ALLEGRA) 180 mg tablet Take  by mouth.  docusate sodium (COLACE) 50 mg capsule Take 50 mg by mouth two (2) times a day.  donepezil (ARICEPT) 5 mg tablet Take 10 mg by mouth nightly. 12    multivitamin,tx-iron-ca-min (THERA-M) 27-0.4 mg Tab Take 1 Tab by mouth daily.        No facility-administered encounter medications on file as of 1/12/2018. Allergies   Allergen Reactions    Peanut Anaphylaxis    Sulfa (Sulfonamide Antibiotics) Unknown (comments)        Social History     Social History    Marital status:      Spouse name: N/A    Number of children: N/A    Years of education: N/A     Occupational History    Not on file. Social History Main Topics    Smoking status: Never Smoker    Smokeless tobacco: Never Used    Alcohol use No    Drug use: No    Sexual activity: Not on file     Other Topics Concern    Not on file     Social History Narrative        Review of Systems  A comprehensive review of systems was negative except for that written in the HPI. Objective:     Vitals:    01/12/18 1507   BP: 110/60   Pulse: 74   Resp: 18   Temp: 97.9 °F (36.6 °C)   TempSrc: Oral   SpO2: 98%   Weight: 140 lb 12.8 oz (63.9 kg)   Height: 5' 5\" (1.651 m)     Pleasant but cognitively impaired elderly WM in no acute distress. ENT: WNL  Cardiac: RRR without murmurs, gallops or rubs. Lungs: Clear to auscultation. Abdomen: Benign. Neuro: No focal motor deficits. Obvious cognitive challenges. ASSESSMENT / PLAN:   1. Alzheimer's dementia without behavioral disturbance, unspecified timing of dementia onset  · Requires supervision and care under direct observation. · Medical problems are reasonably stable. · Ongoing PT is recommended to maintain ambulatory skills and safety. 2. Atherosclerosis of native coronary artery of native heart without angina pectoris  · Continue secondary risk factor reduction measures. Transition to care of Dr Agusto Copeland at [facility]. Follow-up Disposition:  Return if symptoms worsen or fail to improve. Advised him to call back or return to office if symptoms worsen/change/persist.  Discussed expected course/resolution/complications of diagnosis in detail with patient. Medication risks/benefits/costs/interactions/alternatives discussed with patient.   He was given an after visit summary which includes diagnoses, current medications, & vitals. He expressed understanding with the diagnosis and plan.

## 2018-01-12 NOTE — PROGRESS NOTES
Chief Complaint   Patient presents with    Fall     Reviewed record in preparation for visit and have obtained necessary documentation. Identified pt with two pt identifiers(name and ). Health Maintenance Due   Topic    DTaP/Tdap/Td series (1 - Tdap)    Influenza Age 5 to Adult     GLAUCOMA SCREENING Q2Y          Chief Complaint   Patient presents with    Fall        Wt Readings from Last 3 Encounters:   18 140 lb 12.8 oz (63.9 kg)   17 142 lb 12.8 oz (64.8 kg)   06/15/17 147 lb 8 oz (66.9 kg)     Temp Readings from Last 3 Encounters:   18 97.9 °F (36.6 °C) (Oral)   17 98.2 °F (36.8 °C)   06/15/17 97.5 °F (36.4 °C) (Oral)     BP Readings from Last 3 Encounters:   18 110/60   17 138/78   06/15/17 100/60     Pulse Readings from Last 3 Encounters:   18 74   17 82   06/15/17 68           Learning Assessment:  :     Learning Assessment 2016   PRIMARY LEARNER Patient   HIGHEST LEVEL OF EDUCATION - PRIMARY LEARNER  > 4 YEARS OF COLLEGE   BARRIERS PRIMARY LEARNER NONE   CO-LEARNER CAREGIVER No   PRIMARY LANGUAGE ENGLISH    NEED No   LEARNER PREFERENCE PRIMARY OTHER (COMMENT)   LEARNING SPECIAL TOPICS no   ANSWERED BY self   RELATIONSHIP SELF   ASSESSMENT COMMENT no       Depression Screening:  :     PHQ over the last two weeks 2016   Little interest or pleasure in doing things Not at all   Feeling down, depressed or hopeless Not at all   Total Score PHQ 2 0       Fall Risk Assessment:  :     Fall Risk Assessment, last 12 mths 6/15/2017   Able to walk? Yes   Fall in past 12 months? Yes   Fall with injury? Yes   Number of falls in past 12 months 1   Fall Risk Score 2       Abuse Screening:  :     No flowsheet data found. Coordination of Care Questionnaire:  :     1) Have you been to an emergency room, urgent care clinic since your last visit?  yes   Hospitalized since your last visit? no             2) Have you seen or consulted any other health care providers outside of [facility] since your last visit? yes  (Include any pap smears or colon screenings in this section.)    3) Do you have an Advance Directive on file? no    4) Are you interested in receiving information on Advance Directives? NO      Patient is accompanied by son I have received verbal consent from Kim Rg Dr. to discuss any/all medical information while they are present in the room. Reviewed record  In preparation for visit and have obtained necessary documentation.

## 2018-01-18 NOTE — TELEPHONE ENCOUNTER
Left message on patient voicemail ( [phone]) paperwork for [facility] is ready for . Also left a message on son voicemail [phone].   (Vicky Valdez [facility])

## 2018-02-01 NOTE — PROGRESS NOTES
Nurse Navigator noting no ED or inpatient hospitalizations within the last 30 days. No notification of any medical needs and/or barriers to current treatment plan. Patient has attended follow up appointment with PCP as directed. Nurse Navigator will be available as medical needs arise. The current hospital episode will be closed at this time.

## 2024-12-13 NOTE — TELEPHONE ENCOUNTER
Patient needs OV scheduled in next 30 days.
Spoke to patient in attempt to schedule follow up appointment.  He states that his wife schedules all appointments for him and will need to call the office back later
Bleeding that does not stop/Swelling that gets worse